# Patient Record
Sex: FEMALE | Race: WHITE | NOT HISPANIC OR LATINO | Employment: OTHER | URBAN - NONMETROPOLITAN AREA
[De-identification: names, ages, dates, MRNs, and addresses within clinical notes are randomized per-mention and may not be internally consistent; named-entity substitution may affect disease eponyms.]

---

## 2023-01-25 PROBLEM — R31.9 HEMATURIA: Status: ACTIVE | Noted: 2023-01-25

## 2023-01-25 PROBLEM — R10.13 EPIGASTRIC ABDOMINAL PAIN: Status: ACTIVE | Noted: 2023-01-25

## 2023-01-25 PROBLEM — F17.210 CIGARETTE NICOTINE DEPENDENCE WITHOUT COMPLICATION: Status: ACTIVE | Noted: 2023-01-25

## 2023-01-25 PROBLEM — N10 ACUTE PYELONEPHRITIS: Status: ACTIVE | Noted: 2023-01-25

## 2023-01-25 PROBLEM — K80.20 CHOLELITHIASES: Status: ACTIVE | Noted: 2023-01-25

## 2023-01-25 PROBLEM — R42 DIZZY: Status: ACTIVE | Noted: 2023-01-25

## 2023-01-25 PROBLEM — E78.5 HYPERLIPIDEMIA: Status: ACTIVE | Noted: 2023-01-25

## 2023-01-25 PROBLEM — R14.0 ABDOMINAL BLOATING: Status: ACTIVE | Noted: 2023-01-25

## 2023-01-25 PROBLEM — Z86.010 HISTORY OF COLON POLYPS: Status: RESOLVED | Noted: 2023-01-25 | Resolved: 2023-01-25

## 2023-01-25 PROBLEM — R42 VERTIGO: Status: ACTIVE | Noted: 2023-01-25

## 2023-01-25 PROBLEM — K11.21 ACUTE SIALOADENITIS: Status: ACTIVE | Noted: 2023-01-25

## 2023-01-25 PROBLEM — H90.3 SENSORINEURAL HEARING LOSS, BILATERAL: Status: ACTIVE | Noted: 2023-01-25

## 2023-01-25 PROBLEM — E27.8 ADRENAL MASS (MULTI): Status: ACTIVE | Noted: 2023-01-25

## 2023-01-25 PROBLEM — Z86.0100 HISTORY OF COLON POLYPS: Status: RESOLVED | Noted: 2023-01-25 | Resolved: 2023-01-25

## 2023-01-25 PROBLEM — R53.83 FATIGUE: Status: ACTIVE | Noted: 2023-01-25

## 2023-01-25 PROBLEM — R10.9 ABDOMINAL PAIN: Status: ACTIVE | Noted: 2023-01-25

## 2023-01-25 PROBLEM — Z86.010 HISTORY OF COLON POLYPS: Status: ACTIVE | Noted: 2023-01-25

## 2023-01-25 PROBLEM — R30.0 DYSURIA: Status: ACTIVE | Noted: 2023-01-25

## 2023-01-25 PROBLEM — M81.0 OSTEOPOROSIS: Status: ACTIVE | Noted: 2023-01-25

## 2023-01-25 PROBLEM — K29.70 GASTRITIS: Status: ACTIVE | Noted: 2023-01-25

## 2023-01-25 PROBLEM — K80.00 ACUTE CHOLECYSTITIS DUE TO BILIARY CALCULUS: Status: ACTIVE | Noted: 2023-01-25

## 2023-01-25 PROBLEM — Z86.0100 HISTORY OF COLON POLYPS: Status: ACTIVE | Noted: 2023-01-25

## 2023-01-25 PROBLEM — K11.5 SALIVARY STONE: Status: ACTIVE | Noted: 2023-01-25

## 2023-01-25 PROBLEM — D72.829 LEUKOCYTOSIS: Status: ACTIVE | Noted: 2023-01-25

## 2023-01-25 PROBLEM — R91.1 LUNG NODULE: Status: ACTIVE | Noted: 2023-01-25

## 2023-01-25 PROBLEM — M79.89 FOOT SWELLING: Status: ACTIVE | Noted: 2023-01-25

## 2023-01-25 RX ORDER — CHOLECALCIFEROL (VITAMIN D3) 125 MCG
CAPSULE ORAL
COMMUNITY

## 2023-01-25 RX ORDER — MULTIVITAMIN
TABLET ORAL
COMMUNITY

## 2023-02-23 LAB
APPEARANCE, URINE: CLEAR
BILIRUBIN, URINE: NEGATIVE
BLOOD, URINE: ABNORMAL
COLOR, URINE: YELLOW
GLUCOSE, URINE: NEGATIVE MG/DL
KETONES, URINE: NEGATIVE MG/DL
LEUKOCYTE ESTERASE, URINE: NEGATIVE
MUCUS, URINE: ABNORMAL /LPF
NITRITE, URINE: NEGATIVE
PH, URINE: 7 (ref 5–8)
PROTEIN, URINE: NEGATIVE MG/DL
RBC, URINE: 12 /HPF (ref 0–5)
SPECIFIC GRAVITY, URINE: 1.01 (ref 1–1.03)
SQUAMOUS EPITHELIAL CELLS, URINE: 1 /HPF
URINE CULTURE: NORMAL
UROBILINOGEN, URINE: <2 MG/DL (ref 0–1.9)
WBC, URINE: 1 /HPF (ref 0–5)

## 2023-03-09 ENCOUNTER — APPOINTMENT (OUTPATIENT)
Dept: PRIMARY CARE | Facility: CLINIC | Age: 63
End: 2023-03-09
Payer: COMMERCIAL

## 2023-06-06 ENCOUNTER — TELEPHONE (OUTPATIENT)
Dept: PRIMARY CARE | Facility: CLINIC | Age: 63
End: 2023-06-06
Payer: COMMERCIAL

## 2023-06-06 NOTE — TELEPHONE ENCOUNTER
"Patient found a tick attached (location on body) - L leg by knee     They reported the following information to me: When (did they find it on them)-yesterday    Location/ Travel (where in the country) did this happen? - [facility]  Do they know how long it was on them? \"Not very Long\"  ( tick engorged/full of blood)- unsure   When Tick was removed was head attached?- yes  Do you have a rash? No  (explain what rash look like)- Do you have flu like symptoms?- no    I have informed the patient that in General the CDC does not recommend taking antibiotics for a tick bite and that most patients do not need prophylactic antibiotics against Lyme disease. I explained to them that there is a very low chance of julia Lyme disease if the tick was attached less than 36 hours. Cleaning the area with soap  & water is all that is needed in most cases but I gathered this information to be reviewed by the provider to make sure they do not meet the criteria of needing an antibiotic.   I also advised the patient if interested the CDC website also has a lot of great information on tick bites. Please review and call patient back to provider recommendations. Thank you   "

## 2024-04-18 DIAGNOSIS — R31.9 HEMATURIA, UNSPECIFIED TYPE: Primary | ICD-10-CM

## 2024-04-18 NOTE — PROGRESS NOTES
Pt called and states she never did get the cysto or CTU done and she has an upcoming appt with Lexi in June and is asking for us to place new orders in the system so she can schedule them. I placed order for the urine to be done 2 weeks prior to cysto and scheduled cysto with Dr. Monroe. I also placed an order for a BMP for prior to the CTU but the system would not let me place an order for the CTU. Please place that order and the pt will call to schedule, thanks.

## 2024-04-29 ENCOUNTER — LAB (OUTPATIENT)
Dept: LAB | Facility: LAB | Age: 64
End: 2024-04-29
Payer: COMMERCIAL

## 2024-04-29 DIAGNOSIS — R31.9 HEMATURIA, UNSPECIFIED TYPE: ICD-10-CM

## 2024-04-29 PROCEDURE — 87186 SC STD MICRODIL/AGAR DIL: CPT

## 2024-04-29 PROCEDURE — 87086 URINE CULTURE/COLONY COUNT: CPT

## 2024-04-29 PROCEDURE — 80048 BASIC METABOLIC PNL TOTAL CA: CPT

## 2024-04-29 PROCEDURE — 81001 URINALYSIS AUTO W/SCOPE: CPT

## 2024-04-29 PROCEDURE — 36415 COLL VENOUS BLD VENIPUNCTURE: CPT

## 2024-04-30 LAB
ANION GAP SERPL CALC-SCNC: 14 MMOL/L (ref 10–20)
BACTERIA #/AREA URNS AUTO: ABNORMAL /HPF
BUN SERPL-MCNC: 10 MG/DL (ref 6–23)
CALCIUM SERPL-MCNC: 9.4 MG/DL (ref 8.6–10.6)
CHLORIDE SERPL-SCNC: 99 MMOL/L (ref 98–107)
CO2 SERPL-SCNC: 29 MMOL/L (ref 21–32)
CREAT SERPL-MCNC: 0.64 MG/DL (ref 0.5–1.05)
EGFRCR SERPLBLD CKD-EPI 2021: >90 ML/MIN/1.73M*2
GLUCOSE SERPL-MCNC: 79 MG/DL (ref 74–99)
POTASSIUM SERPL-SCNC: 4.8 MMOL/L (ref 3.5–5.3)
RBC #/AREA URNS AUTO: ABNORMAL /HPF
SODIUM SERPL-SCNC: 137 MMOL/L (ref 136–145)
SQUAMOUS #/AREA URNS AUTO: ABNORMAL /HPF
WBC #/AREA URNS AUTO: ABNORMAL /HPF

## 2024-05-01 ENCOUNTER — TELEPHONE (OUTPATIENT)
Dept: UROLOGY | Facility: CLINIC | Age: 64
End: 2024-05-01
Payer: COMMERCIAL

## 2024-05-01 DIAGNOSIS — N30.00 ACUTE CYSTITIS WITHOUT HEMATURIA: Primary | ICD-10-CM

## 2024-05-01 RX ORDER — NITROFURANTOIN 25; 75 MG/1; MG/1
100 CAPSULE ORAL 2 TIMES DAILY
Qty: 14 CAPSULE | Refills: 0 | Status: SHIPPED | OUTPATIENT
Start: 2024-05-01 | End: 2024-05-08

## 2024-05-01 NOTE — TELEPHONE ENCOUNTER
----- Message from JAYNE Ansari sent at 5/1/2024  8:17 AM EDT -----  Macrobid sent empirically as appears growing out infection, that way she has something if doesn't want to wait until sensitivity. Lexi Guerrero  ----- Message -----  From: Lab, Background User  Sent: 4/30/2024   1:17 AM EDT  To: JAYNE Ansari

## 2024-05-02 ENCOUNTER — TELEPHONE (OUTPATIENT)
Dept: UROLOGY | Facility: CLINIC | Age: 64
End: 2024-05-02
Payer: COMMERCIAL

## 2024-05-02 LAB — BACTERIA UR CULT: ABNORMAL

## 2024-05-02 NOTE — TELEPHONE ENCOUNTER
----- Message from JAYNE Ansari sent at 5/2/2024  1:04 PM EDT -----  Finish macrobid (sensitive)  ----- Message -----  From: Lab, Background User  Sent: 4/30/2024   1:17 AM EDT  To: JAYNE Ansari

## 2024-05-06 ENCOUNTER — HOSPITAL ENCOUNTER (OUTPATIENT)
Dept: RADIOLOGY | Facility: CLINIC | Age: 64
Discharge: HOME | End: 2024-05-06
Payer: COMMERCIAL

## 2024-05-06 DIAGNOSIS — R31.9 HEMATURIA, UNSPECIFIED TYPE: ICD-10-CM

## 2024-05-06 PROCEDURE — 74178 CT ABD&PLV WO CNTR FLWD CNTR: CPT | Performed by: RADIOLOGY

## 2024-05-06 PROCEDURE — 76377 3D RENDER W/INTRP POSTPROCES: CPT

## 2024-05-06 PROCEDURE — 2550000001 HC RX 255 CONTRASTS: Performed by: NURSE PRACTITIONER

## 2024-05-06 PROCEDURE — 76376 3D RENDER W/INTRP POSTPROCES: CPT | Performed by: RADIOLOGY

## 2024-05-06 RX ADMIN — IOHEXOL 100 ML: 350 INJECTION, SOLUTION INTRAVENOUS at 11:51

## 2024-05-09 DIAGNOSIS — R31.9 HEMATURIA, UNSPECIFIED TYPE: Primary | ICD-10-CM

## 2024-05-15 ENCOUNTER — APPOINTMENT (OUTPATIENT)
Dept: UROLOGY | Facility: CLINIC | Age: 64
End: 2024-05-15
Payer: COMMERCIAL

## 2024-06-04 ENCOUNTER — LAB (OUTPATIENT)
Dept: LAB | Facility: LAB | Age: 64
End: 2024-06-04
Payer: COMMERCIAL

## 2024-06-04 DIAGNOSIS — R31.9 HEMATURIA, UNSPECIFIED TYPE: ICD-10-CM

## 2024-06-04 PROCEDURE — 87086 URINE CULTURE/COLONY COUNT: CPT

## 2024-06-06 LAB — BACTERIA UR CULT: ABNORMAL

## 2024-06-07 DIAGNOSIS — R31.21 ASYMPTOMATIC MICROSCOPIC HEMATURIA: Primary | ICD-10-CM

## 2024-06-13 ENCOUNTER — LAB (OUTPATIENT)
Dept: LAB | Facility: LAB | Age: 64
End: 2024-06-13
Payer: COMMERCIAL

## 2024-06-13 DIAGNOSIS — R31.21 ASYMPTOMATIC MICROSCOPIC HEMATURIA: ICD-10-CM

## 2024-06-13 PROCEDURE — 87186 SC STD MICRODIL/AGAR DIL: CPT

## 2024-06-13 PROCEDURE — 87086 URINE CULTURE/COLONY COUNT: CPT

## 2024-06-16 LAB — BACTERIA UR CULT: ABNORMAL

## 2024-06-17 DIAGNOSIS — N30.00 ACUTE CYSTITIS WITHOUT HEMATURIA: Primary | ICD-10-CM

## 2024-06-17 DIAGNOSIS — R31.9 HEMATURIA, UNSPECIFIED TYPE: ICD-10-CM

## 2024-06-17 RX ORDER — NITROFURANTOIN 25; 75 MG/1; MG/1
100 CAPSULE ORAL 2 TIMES DAILY
Qty: 14 CAPSULE | Refills: 0 | Status: SHIPPED | OUTPATIENT
Start: 2024-06-17 | End: 2024-06-24

## 2024-06-18 ENCOUNTER — APPOINTMENT (OUTPATIENT)
Dept: UROLOGY | Facility: CLINIC | Age: 64
End: 2024-06-18
Payer: COMMERCIAL

## 2024-07-13 ENCOUNTER — LAB (OUTPATIENT)
Dept: LAB | Facility: LAB | Age: 64
End: 2024-07-13
Payer: COMMERCIAL

## 2024-07-13 DIAGNOSIS — N30.00 ACUTE CYSTITIS WITHOUT HEMATURIA: ICD-10-CM

## 2024-07-13 DIAGNOSIS — R31.9 HEMATURIA, UNSPECIFIED TYPE: ICD-10-CM

## 2024-07-13 LAB
APPEARANCE UR: CLEAR
BILIRUB UR STRIP.AUTO-MCNC: NEGATIVE MG/DL
COLOR UR: YELLOW
GLUCOSE UR STRIP.AUTO-MCNC: NORMAL MG/DL
KETONES UR STRIP.AUTO-MCNC: NEGATIVE MG/DL
LEUKOCYTE ESTERASE UR QL STRIP.AUTO: NEGATIVE
NITRITE UR QL STRIP.AUTO: NEGATIVE
PH UR STRIP.AUTO: 7.5 [PH]
PROT UR STRIP.AUTO-MCNC: ABNORMAL MG/DL
RBC # UR STRIP.AUTO: ABNORMAL /UL
RBC #/AREA URNS AUTO: >20 /HPF
SP GR UR STRIP.AUTO: 1.01
SQUAMOUS #/AREA URNS AUTO: ABNORMAL /HPF
UROBILINOGEN UR STRIP.AUTO-MCNC: NORMAL MG/DL
WBC #/AREA URNS AUTO: ABNORMAL /HPF

## 2024-07-13 PROCEDURE — 81001 URINALYSIS AUTO W/SCOPE: CPT

## 2024-07-13 PROCEDURE — 87086 URINE CULTURE/COLONY COUNT: CPT

## 2024-07-15 LAB — BACTERIA UR CULT: NORMAL

## 2024-07-30 ENCOUNTER — APPOINTMENT (OUTPATIENT)
Dept: UROLOGY | Facility: CLINIC | Age: 64
End: 2024-07-30
Payer: COMMERCIAL

## 2024-07-30 VITALS
HEIGHT: 69 IN | HEART RATE: 84 BPM | WEIGHT: 150 LBS | SYSTOLIC BLOOD PRESSURE: 165 MMHG | BODY MASS INDEX: 22.22 KG/M2 | DIASTOLIC BLOOD PRESSURE: 97 MMHG

## 2024-07-30 DIAGNOSIS — R35.0 URINE FREQUENCY: Primary | ICD-10-CM

## 2024-07-30 LAB
POC APPEARANCE, URINE: CLEAR
POC BILIRUBIN, URINE: NEGATIVE
POC BLOOD, URINE: ABNORMAL
POC COLOR, URINE: YELLOW
POC GLUCOSE, URINE: NEGATIVE MG/DL
POC KETONES, URINE: NEGATIVE MG/DL
POC LEUKOCYTES, URINE: NEGATIVE
POC NITRITE,URINE: NEGATIVE
POC PH, URINE: 7.5 PH
POC PROTEIN, URINE: NEGATIVE MG/DL
POC SPECIFIC GRAVITY, URINE: 1.01
POC UROBILINOGEN, URINE: 0.2 EU/DL

## 2024-07-30 PROCEDURE — 81003 URINALYSIS AUTO W/O SCOPE: CPT | Performed by: STUDENT IN AN ORGANIZED HEALTH CARE EDUCATION/TRAINING PROGRAM

## 2024-07-30 NOTE — PROGRESS NOTES
"PRE-OP DIAGNOSIS:     1. Urine frequency  POCT UA Automated manually resulted        POST-OP DIAGNOSIS:   Same.  ANESTHESIA:   2% Lidocaine.  PROCEDURE:   Cystoscopy.  SURGEON:   Cr Roman MD    INSTRUMENTS: 19 Upper sorbian flexible cystoscope.    A \"TIME OUT\" was performed prior to the procedure using active communication to verify:  Correct patient: YES  Correct Procedure/site: YES    Patient was taken to the Clinic procedure room and placed in the frog-leg position on the table, prepped and draped in the usual sterile manner.  2% Lidocaine jelly was instilled in the urethra.     Findings: Patent meatus with healthy urethra.  Bilateral ureteral orifice in their orthotopic position.  Mild trabeculation of the bladder.  No stones masses or diverticulum identified.    Patient was discharged to home without complications.    Plan: Grossly normal cystoscopic evaluation.    Cr Roman MD   "

## 2024-09-24 ENCOUNTER — APPOINTMENT (OUTPATIENT)
Dept: UROLOGY | Facility: CLINIC | Age: 64
End: 2024-09-24
Payer: COMMERCIAL

## 2024-09-24 VITALS
HEART RATE: 73 BPM | TEMPERATURE: 96.7 F | DIASTOLIC BLOOD PRESSURE: 88 MMHG | WEIGHT: 146 LBS | BODY MASS INDEX: 21.62 KG/M2 | SYSTOLIC BLOOD PRESSURE: 158 MMHG | HEIGHT: 69 IN

## 2024-09-24 DIAGNOSIS — R31.9 HEMATURIA, UNSPECIFIED TYPE: Primary | ICD-10-CM

## 2024-09-24 DIAGNOSIS — R39.15 URINARY URGENCY: ICD-10-CM

## 2024-09-24 DIAGNOSIS — N30.00 ACUTE CYSTITIS WITHOUT HEMATURIA: ICD-10-CM

## 2024-09-24 DIAGNOSIS — N39.0 RECURRENT UTI: ICD-10-CM

## 2024-09-24 LAB
POC APPEARANCE, URINE: CLEAR
POC BILIRUBIN, URINE: NEGATIVE
POC BLOOD, URINE: ABNORMAL
POC COLOR, URINE: YELLOW
POC GLUCOSE, URINE: NEGATIVE MG/DL
POC KETONES, URINE: NEGATIVE MG/DL
POC LEUKOCYTES, URINE: ABNORMAL
POC NITRITE,URINE: POSITIVE
POC PH, URINE: 7 PH
POC PROTEIN, URINE: ABNORMAL MG/DL
POC SPECIFIC GRAVITY, URINE: 1.01
POC UROBILINOGEN, URINE: 0.2 EU/DL

## 2024-09-24 PROCEDURE — 3008F BODY MASS INDEX DOCD: CPT | Performed by: NURSE PRACTITIONER

## 2024-09-24 PROCEDURE — 87086 URINE CULTURE/COLONY COUNT: CPT

## 2024-09-24 PROCEDURE — 99214 OFFICE O/P EST MOD 30 MIN: CPT | Performed by: NURSE PRACTITIONER

## 2024-09-24 PROCEDURE — 81002 URINALYSIS NONAUTO W/O SCOPE: CPT | Performed by: NURSE PRACTITIONER

## 2024-09-24 RX ORDER — NITROFURANTOIN 25; 75 MG/1; MG/1
100 CAPSULE ORAL 2 TIMES DAILY
Qty: 14 CAPSULE | Refills: 0 | Status: SHIPPED | OUTPATIENT
Start: 2024-09-24 | End: 2024-10-01

## 2024-09-24 RX ORDER — TRIMETHOPRIM 100 MG/1
100 TABLET ORAL DAILY
Qty: 30 TABLET | Refills: 2 | Status: SHIPPED | OUTPATIENT
Start: 2024-09-24 | End: 2024-12-23

## 2024-09-24 NOTE — PATIENT INSTRUCTIONS
Psyllium fiber capsules, gummies ([employer]) to manage diarrhea prevent UTIs    Patient is going to Oncology hx breast cancer, discussed estrogen vaginal cream  But would like to run past her oncologist prior to prescribing    Macrobid to treat UTI  Urine culture sent today  Trimethoprim 100 mg nightly start after the macrobid    Probiotics once daily in a.m. or lunch time    Would prefer start w managing UTIs and then will consider OAB medicine if needed  Nurse line [phone]    2-3 mos pelvic exam

## 2024-09-24 NOTE — PROGRESS NOTES
"09/24/24   [id number]    Follow up microscopic hematuria, UTI symptoms, urinary frequency, urgency     Subjective      HPI Padmini Mckeon is a 64 y.o. female who presents for follow up microscopic hematuria; last seen 6/22/24;     CT Urogram 5/6/24:  no solid renal mass, small left renal/hemorrhagic proteinaceous cyst, several small subcentimeter hypodensities kidneys bilaterally too small to characterize, no hydro, no stones;     Cystoscopy w Dr. Roman on 7/30/24 normal    Had seen Dr. 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, he is in school now, so more flexible w schedule now;     Objective     /88   Pulse 73   Temp 35.9 °C (96.7 °F)   Ht 1.753 m (5' 9\")   Wt 66.2 kg (146 lb)   BMI 21.56 kg/m²    Physical Exam  General: Appears comfortable and in no apparent distress, well nourished  Head: Normocephalic, atraumatic  Neck: trachea midline  Respiratory: respirations unlabored, no wheezes, and no use of accessory muscles  Cardiovascular: at rest no dyspnea, well perfused  Skin: no visible rashes or lesions  Neurologic: grossly intact, oriented to person, place, and time  Psychiatric: mood and affect appropriate  Musculoskeletal: in chair for appt. no difficulty w upper body movement    Assessment/Plan   Problem List Items Addressed This Visit          Genitourinary and Reproductive    Hematuria - Primary    Relevant Orders    POCT UA (nonautomated) manually resulted (Completed)    Post-Void Residual (Completed)    Urine culture     Other Visit Diagnoses       Urinary urgency "        Relevant Orders    POCT UA (nonautomated) manually resulted (Completed)    Post-Void Residual (Completed)    Urine culture    Acute cystitis without hematuria        Relevant Medications    nitrofurantoin, macrocrystal-monohydrate, (Macrobid) 100 mg capsule    Recurrent UTI        Relevant Medications    trimethoprim (Trimpex) 100 mg tablet          Orders Placed This Encounter   Procedures    Post-Void Residual    Urine culture     Standing Status:   Future     Number of Occurrences:   1     Standing Expiration Date:   9/24/2025     Order Specific Question:   Release result to [facility]     Answer:   Immediate [1]    POCT UA (nonautomated) manually resulted     Order Specific Question:   [facility]     Answer:   Immediate [1]     Psyllium fiber capsules, gummies ([employer]) to manage diarrhea prevent UTIs    Patient is going to Oncology hx breast cancer, discussed estrogen vaginal cream  But would like to run past her oncologist prior to prescribing    Macrobid to treat UTI  Urine culture sent today  Trimethoprim 100 mg nightly start after the macrobid    Probiotics once daily in a.m. or lunch time    Would prefer start w managing UTIs and then will consider OAB medicine if needed  Nurse line [phone]    2-3 mos pelvic exam         Lexi Ceron, APRN-CNP  Lab Results   Component Value Date    GLUCOSE 79 04/29/2024    CALCIUM 9.4 04/29/2024     04/29/2024    K 4.8 04/29/2024    CO2 29 04/29/2024    CL 99 04/29/2024    BUN 10 04/29/2024    CREATININE 0.64 04/29/2024

## 2024-09-25 LAB — BACTERIA UR CULT: NO GROWTH

## 2024-09-27 ENCOUNTER — TELEPHONE (OUTPATIENT)
Dept: UROLOGY | Facility: CLINIC | Age: 64
End: 2024-09-27
Payer: COMMERCIAL

## 2024-09-27 DIAGNOSIS — R31.29 MICROHEMATURIA: Primary | ICD-10-CM

## 2024-09-27 NOTE — TELEPHONE ENCOUNTER
----- Message from Lexi Ceron sent at 9/26/2024  6:55 PM EDT -----  Ok, we'll need to recheck urine in 4-6 weeks to make sure no further blood, she can do at lab, w urine reflex micro. Thank you  ----- Message -----  From: Donna Alejandro MA  Sent: 9/26/2024   3:47 PM EDT  To: JAYNE Ansari    So, I had to talk to her to remember but what happened was Carmina roomed her and dipped it, which was a very positive dip so you ordered the urine culture but I did not realize that she had only given us this very small amount of urine and she was already gone so I was unable to have her try to urinate again. I sent what little we had but I'm willing to bet that maybe they did not have a sufficient quantity to grow a proper culture. Pt states she is symptomatic and started the Macrobid last night. I advised her to continue to take it tonight and I will call her back tomorrow after speaking with you. Please advise, thanks.  ----- Message -----  From: JAYNE Ansari  Sent: 9/25/2024   5:41 PM EDT  To: Donna Alejandro MA    Did you prepare the urines yesterday or did Carmina? I want to make sure done correctly as urine appeared so infected if so, please reach out to patient to see if she took any antibiotic at home before she came in that could have effected the results. So strange that culture negative.     Thank you, Lexi  ----- Message -----  From: Carmina Farfan MA  Sent: 9/24/2024  10:55 AM EDT  To: JAYNE Ansari

## 2025-01-02 ENCOUNTER — APPOINTMENT (OUTPATIENT)
Dept: UROLOGY | Facility: CLINIC | Age: 65
End: 2025-01-02
Payer: COMMERCIAL

## 2025-02-15 LAB
APPEARANCE UR: CLEAR
BACTERIA #/AREA URNS HPF: ABNORMAL /HPF
BACTERIA UR CULT: ABNORMAL
BILIRUB UR QL STRIP: NEGATIVE
COLOR UR: YELLOW
GLUCOSE UR QL STRIP: NEGATIVE
HGB UR QL STRIP: ABNORMAL
HYALINE CASTS #/AREA URNS LPF: ABNORMAL /LPF
KETONES UR QL STRIP: NEGATIVE
LEUKOCYTE ESTERASE UR QL STRIP: NEGATIVE
NITRITE UR QL STRIP: POSITIVE
PH UR STRIP: 7 [PH] (ref 5–8)
PROT UR QL STRIP: ABNORMAL
RBC #/AREA URNS HPF: ABNORMAL /HPF
SERVICE CMNT-IMP: ABNORMAL
SP GR UR STRIP: 1.01 (ref 1–1.03)
SQUAMOUS #/AREA URNS HPF: ABNORMAL /HPF
WBC #/AREA URNS HPF: ABNORMAL /HPF